# Patient Record
Sex: FEMALE | Race: BLACK OR AFRICAN AMERICAN | NOT HISPANIC OR LATINO | Employment: FULL TIME | URBAN - METROPOLITAN AREA
[De-identification: names, ages, dates, MRNs, and addresses within clinical notes are randomized per-mention and may not be internally consistent; named-entity substitution may affect disease eponyms.]

---

## 2021-05-03 ENCOUNTER — ANNUAL EXAM (OUTPATIENT)
Dept: OBGYN CLINIC | Facility: CLINIC | Age: 31
End: 2021-05-03
Payer: COMMERCIAL

## 2021-05-03 VITALS
SYSTOLIC BLOOD PRESSURE: 114 MMHG | WEIGHT: 158.4 LBS | BODY MASS INDEX: 28.07 KG/M2 | HEIGHT: 63 IN | DIASTOLIC BLOOD PRESSURE: 70 MMHG

## 2021-05-03 DIAGNOSIS — Z01.419 WOMEN'S ANNUAL ROUTINE GYNECOLOGICAL EXAMINATION: Primary | ICD-10-CM

## 2021-05-03 DIAGNOSIS — Z11.3 SCREENING EXAMINATION FOR STD (SEXUALLY TRANSMITTED DISEASE): ICD-10-CM

## 2021-05-03 PROCEDURE — 87491 CHLMYD TRACH DNA AMP PROBE: CPT | Performed by: NURSE PRACTITIONER

## 2021-05-03 PROCEDURE — G0145 SCR C/V CYTO,THINLAYER,RESCR: HCPCS | Performed by: NURSE PRACTITIONER

## 2021-05-03 PROCEDURE — 87624 HPV HI-RISK TYP POOLED RSLT: CPT | Performed by: NURSE PRACTITIONER

## 2021-05-03 PROCEDURE — 87591 N.GONORRHOEAE DNA AMP PROB: CPT | Performed by: NURSE PRACTITIONER

## 2021-05-03 PROCEDURE — 99385 PREV VISIT NEW AGE 18-39: CPT | Performed by: NURSE PRACTITIONER

## 2021-05-03 NOTE — PROGRESS NOTES
Subjective    HPI:     Osiel Barakat is a 26 y o  nulliparous female  Her menstrual cycles are regular and predictable  Her current method of contraception includes condoms  She denies issues with intimacy  She denies /GI and Gyn complaints  She feels safe at home  She denies depression/anxiety  Medical, surgical and family history reviewed  Her dental care is up-to-date - 6 month ago  She eats a healthy diet and exercises  She is not happy with her weight  Gynecologic History    Patient's last menstrual period was 2021  Last Pap: 3 years ago - normal    Obstetric History    OB History    Para Term  AB Living   0 0 0 0 0 0   SAB TAB Ectopic Multiple Live Births   0 0 0 0 0       The following portions of the patient's history were reviewed and updated as appropriate: allergies, current medications, past family history, past medical history, past social history, past surgical history and problem list     Review of Systems    Pertinent items are noted in HPI  Objective    Physical Exam  Constitutional:       Appearance: Normal appearance  She is well-developed  Genitourinary:      Pelvic exam was performed with patient in the lithotomy position  Vulva, inguinal canal, urethra, bladder, vagina, uterus, right adnexa and left adnexa normal       No posterior fourchette tenderness, injury, rash or lesion present  Cervix is nulliparous  Cervical friability present  No cervical motion tenderness, discharge, lesion, erythema, bleeding, polyp or nabothian cyst       Uterus is anteverted  No right or left adnexal mass present  Right adnexa not tender or full  Left adnexa not tender or full  HENT:      Head: Normocephalic and atraumatic  Neck:      Musculoskeletal: Neck supple  Thyroid: No thyromegaly  Cardiovascular:      Rate and Rhythm: Normal rate and regular rhythm        Heart sounds: Normal heart sounds, S1 normal and S2 normal  Pulmonary:      Effort: Pulmonary effort is normal       Breath sounds: Normal breath sounds  Chest:      Breasts: Breasts are symmetrical          Right: Normal  No inverted nipple, mass, nipple discharge, skin change or tenderness  Left: Normal  No inverted nipple, mass, nipple discharge, skin change or tenderness  Abdominal:      General: Bowel sounds are normal  There is no distension  Palpations: Abdomen is soft  There is no mass  Tenderness: There is no abdominal tenderness  There is no guarding  Lymphadenopathy:      Cervical: No cervical adenopathy  Upper Body:      Right upper body: No supraclavicular or axillary adenopathy  Left upper body: No supraclavicular or axillary adenopathy  Neurological:      Mental Status: She is alert  Skin:     General: Skin is warm and dry  Findings: No rash  Psychiatric:         Attention and Perception: Attention and perception normal          Mood and Affect: Mood and affect normal          Speech: Speech normal          Behavior: Behavior is cooperative  Thought Content: Thought content normal          Cognition and Memory: Cognition and memory normal          Judgment: Judgment normal    Vitals signs and nursing note reviewed  Assessment and Plan    Maryjane was seen today for gynecologic exam     Diagnoses and all orders for this visit:    Women's annual routine gynecological examination  -     Liquid-based pap, screening    Screening examination for STD (sexually transmitted disease)  -     Chlamydia/GC amplified DNA by PCR  -     Herpes I/II IgG Antibodies  -     Hepatitis panel, acute  -     HIV 1/2 Antigen/Antibody (4th Generation) w Reflex [facility]  -     RPR      Patient informed of a Stable GYN exam  A pap smear was performed  I have discussed the importance of exercise and healthy diet as well as adequate intake of calcium and vitamin D  The current ASCCP guidelines were reviewed   The low risk patient will receive pap smear screening every 3 years until the age of 34 and then every 3 to 5 years with HPV co-testing from the ages of 33-67  I emphasized the importance of an annual pelvic and breast exam  A yearly mammogram is recommended for breast cancer screening starting at age 36  All questions have been answered to her satisfaction  Follow up in: 1 year

## 2021-05-05 LAB
C TRACH DNA SPEC QL NAA+PROBE: NEGATIVE
N GONORRHOEA DNA SPEC QL NAA+PROBE: NEGATIVE

## 2021-05-06 LAB
HPV HR 12 DNA CVX QL NAA+PROBE: NEGATIVE
HPV16 DNA CVX QL NAA+PROBE: NEGATIVE
HPV18 DNA CVX QL NAA+PROBE: NEGATIVE

## 2021-05-07 LAB
LAB AP GYN PRIMARY INTERPRETATION: NORMAL
Lab: NORMAL
PATH INTERP SPEC-IMP: NORMAL

## 2021-05-08 ENCOUNTER — APPOINTMENT (OUTPATIENT)
Dept: LAB | Age: 31
End: 2021-05-08
Payer: COMMERCIAL

## 2021-05-08 ENCOUNTER — OFFICE VISIT (OUTPATIENT)
Dept: URGENT CARE | Age: 31
End: 2021-05-08
Payer: COMMERCIAL

## 2021-05-08 VITALS
SYSTOLIC BLOOD PRESSURE: 100 MMHG | HEART RATE: 90 BPM | WEIGHT: 156 LBS | OXYGEN SATURATION: 100 % | HEIGHT: 63 IN | RESPIRATION RATE: 20 BRPM | BODY MASS INDEX: 27.64 KG/M2 | TEMPERATURE: 97.5 F | DIASTOLIC BLOOD PRESSURE: 51 MMHG

## 2021-05-08 DIAGNOSIS — Z11.1 SCREENING-PULMONARY TB: ICD-10-CM

## 2021-05-08 DIAGNOSIS — Z00.00 ANNUAL PHYSICAL EXAM: ICD-10-CM

## 2021-05-08 DIAGNOSIS — Z13.0 SCREENING FOR SICKLE-CELL DISEASE OR TRAIT: Primary | ICD-10-CM

## 2021-05-08 DIAGNOSIS — Z13.0 SCREENING FOR SICKLE-CELL DISEASE OR TRAIT: ICD-10-CM

## 2021-05-08 LAB
HAV IGM SER QL: NORMAL
HBV CORE IGM SER QL: NORMAL
HBV SURFACE AG SER QL: NORMAL
HCV AB SER QL: NORMAL

## 2021-05-08 PROCEDURE — 36415 COLL VENOUS BLD VENIPUNCTURE: CPT

## 2021-05-08 PROCEDURE — 99213 OFFICE O/P EST LOW 20 MIN: CPT | Performed by: NURSE PRACTITIONER

## 2021-05-08 PROCEDURE — 86696 HERPES SIMPLEX TYPE 2 TEST: CPT | Performed by: NURSE PRACTITIONER

## 2021-05-08 PROCEDURE — 85660 RBC SICKLE CELL TEST: CPT

## 2021-05-08 PROCEDURE — 80074 ACUTE HEPATITIS PANEL: CPT | Performed by: NURSE PRACTITIONER

## 2021-05-08 PROCEDURE — 86695 HERPES SIMPLEX TYPE 1 TEST: CPT | Performed by: NURSE PRACTITIONER

## 2021-05-08 PROCEDURE — 86480 TB TEST CELL IMMUN MEASURE: CPT

## 2021-05-08 PROCEDURE — 86592 SYPHILIS TEST NON-TREP QUAL: CPT | Performed by: NURSE PRACTITIONER

## 2021-05-08 PROCEDURE — 87389 HIV-1 AG W/HIV-1&-2 AB AG IA: CPT | Performed by: NURSE PRACTITIONER

## 2021-05-08 NOTE — PATIENT INSTRUCTIONS
Sickle Cell Disease   AMBULATORY CARE:   Sickle cell disease (SCD)  causes your RBCs to be sickle (crescent) shaped  The sickle shape is caused by abnormal hemoglobin attached to the RBC  Hemoglobin carries oxygen to all tissues in your body  Sickle-shaped RBCs can get stuck to the walls of blood vessels  This can stop or slow blood flow, and prevent oxygen from getting to tissues  When this happens, it is called a sickle cell crisis  SCD may also cause low red blood cell (RBC) levels (anemia)  Common symptoms include the following: The following symptoms may come and go, or may happen during a sickle cell crisis:  · Low energy    · Pain anywhere in your body    · Pale skin    · Headaches    · Shortness of breath    Call your local emergency number (911 in the [address] [facility]) or have someone else call if:   · You have any of the following signs of a stroke:      ? Numbness or drooping on one side of your face     ? Weakness in an arm or leg    ? Confusion or difficulty speaking    ? Dizziness, a severe headache, or vision loss    · You have any of the following signs of a heart attack:      ? Squeezing, pressure, or pain in your chest    ? You may  also have any of the following:     § Discomfort or pain in your back, neck, jaw, stomach, or arm    § Shortness of breath    § Nausea or vomiting    § Lightheadedness or a sudden cold sweat    · You have a seizure  · You lose vision in one or both eyes  · You lose consciousness or cannot be woken  Seek care immediately if:   · You feel lightheaded, short of breath, and have chest pain  · You cough up blood  · You have a fever of 100 4°F (38°C) or higher  · You have a severe headache  · Your pain does not get better after you take pain medicine  · Your arm or leg is painful, red, and larger than usual      · You feel like you can no longer cope with your pain, or feel like harming yourself  · You have abdominal pain, nausea, and vomiting      · You are a man and have an erection that is painful and does not go away after 4 hours  · You cannot think clearly, or you feel like you are going to faint  Call your doctor if:   · Your urine is dark, or you are urinating less than usual or not at all  · You see blood in your urine  · Your eyes or skin are yellow  · You have a cold or the flu  · You have a cough or are wheezing  · You are more tired than usual during the day  · You are constipated or have diarrhea  · Your vision has changed in one or both eyes  · You feel anxious or depressed  · You have new or worse swelling in your joints  · You have an open sore on your skin that will not heal      · You are pregnant or think you are pregnant  · You have questions or concerns about your condition or care  Treatment for sickle cell anemia  may include any of the following:  · Medicines  may be given to decrease pain or sickling of your RBCs  You may also need medicine to treat or prevent a bacterial infection  · A blood transfusion  increases the number of healthy RBCs in your blood  · Surgery  may be done to remove your spleen or gallbladder  Surgery may be needed if RBCs often get stuck in your spleen, or you have gallstones  · A stem cell transplant , also called a bone marrow transplant, helps your body make new, healthy blood cells  Self-care:   · Apply heat to areas of pain  Use a heating pad or take a warm bath  Do this for 20 to 30 minutes every 2 hours for as many days as directed  · Gently massage areas where you feel pain  A professional massage may also help with pain  · Balance rest and exercise  Rest during a sickle cell crisis  Over time, increase your activity  Exercise may help decrease pain  Take breaks during exercise and drink plenty of water  Ask your healthcare provider which activities are safe for you  · Get acupuncture treatment    Acupuncture may help decrease pain and help you relax  Ask your healthcare provider for more information about acupuncture  · Eat healthy foods  Healthy foods will improve your overall health and make it easier to manage SCD  Examples of healthy foods include fruits, vegetables, whole-grain breads, low-fat dairy products, beans, lean meats, and fish  Ask if you need to be on a special diet  Prevent a sickle cell crisis:  A sickle cell crisis may be caused by illness, changes in temperature, stress, dehydration, or being at high altitudes  Do the following to help prevent a sickle cell crisis:  · Drink liquids as directed  Dehydration can increase your risk for a sickle cell crisis  Ask how much liquid to drink each day and which liquids are best for you  · Avoid quick changes in temperature  Do not go quickly from a warm place to a cold place  Get in a pool slowly instead of jumping in  Avoid getting too hot or too cold  Dress in light clothing in the summer and warm clothing in the winter  · Ask about vaccinations  Vaccinations can help prevent a viral infection  Get a flu shot every year as directed  You may also need a pneumonia vaccine  · Wash your hands frequently  Handwashing can help prevent illness  Wash your hands before you prepare or eat food, and after you use the bathroom  · Do not smoke  Nicotine and other chemicals in cigarettes and cigars can cause lung damage and sickle cell crisis  Ask your healthcare provider for information if you currently smoke and need help to quit  E-cigarettes or smokeless tobacco still contain nicotine  Talk to your healthcare provider before you use these products  · Limit or do not drink alcohol  Alcohol can cause dehydration and increase your risk for sickle cell crisis  If you drink alcohol, also drink plenty of water  · Manage your stress  Your healthcare provider may recommend relaxation techniques and deep breathing exercises to help decrease your stress   Your healthcare provider may recommend you talk to someone about your stress or anxiety, such as a counselor or a trusted friend  · Do not travel in an unpressurized plane or travel to high altitudes  These environments are low in oxygen and may cause a sickle cell crisis  Wear medical alert identification:  Wear medical alert jewelry or carry a card that says you have sickle cell anemia  Ask your healthcare provider where to get these items  What you need to know about family planning and pregnancy:   · If you do not want to become pregnant, your healthcare provider may recommend birth control pills that contain only progestin  The pills will prevent pregnancy and make your periods lighter  Lighter periods may help treat low RBC levels  · Talk to your healthcare provider before you get pregnant  SCD increases a woman's risk for problems, such as a miscarriage and having a baby that weighs less than normal  You will need close monitoring during pregnancy  You may need to take certain vitamins and have 1 or more blood transfusions during pregnancy  · You will pass a gene for sickle cell disease to your child  Your partner should be tested for the sickle cell gene  This information can help predict your child's risk for sickle cell disease  Follow up with your doctor as directed: You may need ongoing screening for conditions that can develop because of sickle cell disease  Examples include kidney disease, hypertension (high blood pressure), retinopathy (eye problems), and problems with your lungs  Write down your questions so you remember to ask them during your visits  © Copyright [address] Information is for End User's use only and may not be sold, redistributed or otherwise used for commercial purposes  All illustrations and images included in CareNotes® are the copyrighted property of [employer]  or [employer]  The above information is an  only   It is not intended as medical advice for individual conditions or treatments  Talk to your doctor, nurse or pharmacist before following any medical regimen to see if it is safe and effective for you

## 2021-05-08 NOTE — PROGRESS NOTES
[facility]        NAME: Gissel Rodriguez is a 26 y o  female  : 1990    MRN: [id number]  DATE: May 8, 2021  TIME: 8:55 AM    Assessment and Plan   Screening for sickle-cell disease or trait [Z13 0]  1  Screening for sickle-cell disease or trait  Sickle Cell Screen W/Refl Hemoglobinopathy Eval   2  Screening-pulmonary TB  Quantiferon TB Gold Plus   3  Annual physical exam           Patient Instructions       Normal physical examination      Chief Complaint     Chief Complaint   Patient presents with    Annual Exam     pt here for a physical         History of Present Illness       HPI   Requesting physical examination  They have disabled adults in the house and it is required for all members of the family to get an annual physical examination  Also requesting screening for sickle cell; her fiancee is a sickle cell career  Needs TB blood test for screening for Tuberculosis  Nearsightedness, corrected with glasses    Review of Systems   Review of Systems   Constitutional: Negative for chills and fever  HENT: Negative for congestion, postnasal drip, sinus pressure and trouble swallowing  Eyes: Positive for visual disturbance (corrected with glasses)  Negative for discharge, redness and itching  Respiratory: Negative for cough, chest tightness, shortness of breath and wheezing  Cardiovascular: Negative for chest pain and palpitations  Gastrointestinal: Negative for abdominal pain, constipation, diarrhea and vomiting  Genitourinary: Negative for difficulty urinating  Musculoskeletal: Negative for back pain and neck pain  Skin: Negative for color change, pallor, rash and wound  Neurological: Negative for dizziness, tremors, seizures, syncope, weakness and headaches  Current Medications     No current outpatient medications on file      Current Allergies     Allergies as of 2021    (No Known Allergies)            The following portions of the patient's history were reviewed and updated as appropriate: allergies, current medications, past family history, past medical history, past social history, past surgical history and problem list      History reviewed  No pertinent past medical history  History reviewed  No pertinent surgical history  Family History   Problem Relation Age of Onset    Hypertension Father     Diabetes Maternal Grandmother     Diabetes Maternal Grandfather     Diabetes Paternal Grandmother     Schizophrenia Paternal Grandmother     Diabetes Paternal Grandfather     Hyperlipidemia Mother          Medications have been verified  Objective   /51   Pulse 90   Temp 97 5 °F (36 4 °C)   Resp 20   Ht 5' 3" (1 6 m)   Wt 70 8 kg (156 lb)   LMP 04/14/2021   SpO2 100%   BMI 27 63 kg/m²   Patient's last menstrual period was 04/14/2021  Physical Exam     Physical Exam  Constitutional:       Appearance: She is not ill-appearing or diaphoretic  HENT:      Right Ear: Tympanic membrane and ear canal normal       Left Ear: Tympanic membrane and ear canal normal       Nose: No rhinorrhea  Mouth/Throat:      Mouth: Mucous membranes are moist       Pharynx: No posterior oropharyngeal erythema  Eyes:      Extraocular Movements: Extraocular movements intact  Conjunctiva/sclera: Conjunctivae normal       Pupils: Pupils are equal, round, and reactive to light  Neck:      Musculoskeletal: Normal range of motion  No neck rigidity  Cardiovascular:      Rate and Rhythm: Regular rhythm  Heart sounds: Normal heart sounds  Pulmonary:      Effort: Pulmonary effort is normal       Breath sounds: Normal breath sounds  Abdominal:      General: Abdomen is flat  Bowel sounds are normal       Tenderness: There is no abdominal tenderness  There is no right CVA tenderness, left CVA tenderness or guarding  Musculoskeletal:         General: No swelling or tenderness  Skin:     Findings: No bruising, lesion or rash     Neurological: General: No focal deficit present  Mental Status: She is alert and oriented to person, place, and time  Psychiatric:         Mood and Affect: Mood normal          Behavior: Behavior normal          Thought Content:  Thought content normal          Judgment: Judgment normal

## 2021-05-10 ENCOUNTER — TELEPHONE (OUTPATIENT)
Dept: OBGYN CLINIC | Facility: CLINIC | Age: 31
End: 2021-05-10

## 2021-05-10 DIAGNOSIS — B96.89 BV (BACTERIAL VAGINOSIS): Primary | ICD-10-CM

## 2021-05-10 DIAGNOSIS — N76.0 BV (BACTERIAL VAGINOSIS): Primary | ICD-10-CM

## 2021-05-10 LAB
GAMMA INTERFERON BACKGROUND BLD IA-ACNC: 0.05 IU/ML
HIV 1+2 AB+HIV1 P24 AG SERPL QL IA: NORMAL
HSV1 IGG SER IA-ACNC: 39.4 INDEX (ref 0–0.9)
HSV2 IGG SER IA-ACNC: <0.91 INDEX (ref 0–0.9)
M TB IFN-G BLD-IMP: NEGATIVE
M TB IFN-G CD4+ BCKGRND COR BLD-ACNC: 0.17 IU/ML
M TB IFN-G CD4+ BCKGRND COR BLD-ACNC: 0.22 IU/ML
MITOGEN IGNF BCKGRD COR BLD-ACNC: >10 IU/ML
RPR SER QL: NORMAL

## 2021-05-10 RX ORDER — METRONIDAZOLE 500 MG/1
500 TABLET ORAL EVERY 12 HOURS SCHEDULED
Qty: 14 TABLET | Refills: 0 | Status: SHIPPED | OUTPATIENT
Start: 2021-05-10 | End: 2021-05-17

## 2021-05-10 NOTE — TELEPHONE ENCOUNTER
Patient informed of positive HSV-1 results and negative HSV-2  Explained the transmission of infection  All questioned answered

## 2021-05-10 NOTE — TELEPHONE ENCOUNTER
----- Message from Bhupinder Huynh, [address] sent at 5/10/2021  4:22 PM EDT -----  Vernon Welch please inform patient that her herpes screening is positive for HSV 1  HSV-1 is mainly transmitted by oral-to-oral contact to cause infection in or around the mouth (oral herpes)  However, HSV-1 can also be transmitted through oral-genital contact to cause infection in or around the genital area ( genital herpes)

## 2021-05-11 LAB — SICKLE CELLS BLD QL SMEAR: NEGATIVE

## 2021-05-28 ENCOUNTER — TELEPHONE (OUTPATIENT)
Dept: URGENT CARE | Age: 31
End: 2021-05-28